# Patient Record
Sex: MALE | Race: WHITE | NOT HISPANIC OR LATINO | Employment: FULL TIME | URBAN - NONMETROPOLITAN AREA
[De-identification: names, ages, dates, MRNs, and addresses within clinical notes are randomized per-mention and may not be internally consistent; named-entity substitution may affect disease eponyms.]

---

## 2021-10-25 ENCOUNTER — HOSPITAL ENCOUNTER (EMERGENCY)
Facility: HOSPITAL | Age: 31
Discharge: HOME | End: 2021-10-25
Attending: EMERGENCY MEDICINE | Admitting: EMERGENCY MEDICINE

## 2021-10-25 ENCOUNTER — APPOINTMENT (OUTPATIENT)
Dept: GENERAL RADIOLOGY | Facility: HOSPITAL | Age: 31
End: 2021-10-25

## 2021-10-25 VITALS
HEART RATE: 75 BPM | OXYGEN SATURATION: 99 % | BODY MASS INDEX: 34 KG/M2 | SYSTOLIC BLOOD PRESSURE: 132 MMHG | DIASTOLIC BLOOD PRESSURE: 81 MMHG | RESPIRATION RATE: 18 BRPM | WEIGHT: 251 LBS | HEIGHT: 72 IN | TEMPERATURE: 98.5 F

## 2021-10-25 DIAGNOSIS — S99.922A INJURY OF LEFT FOOT, INITIAL ENCOUNTER: Primary | ICD-10-CM

## 2021-10-25 PROCEDURE — 99283 EMERGENCY DEPT VISIT LOW MDM: CPT

## 2021-10-25 PROCEDURE — 73630 X-RAY EXAM OF FOOT: CPT

## 2021-10-25 RX ORDER — IBUPROFEN 800 MG/1
800 TABLET ORAL ONCE
Status: COMPLETED | OUTPATIENT
Start: 2021-10-25 | End: 2021-10-25

## 2021-10-25 RX ADMIN — IBUPROFEN 800 MG: 800 TABLET ORAL at 11:46

## 2021-10-25 NOTE — DISCHARGE INSTRUCTIONS
Rest, ice, and elevate the foot to help with pain and swelling.  Alternate ibuprofen and Tylenol.  May use walking boot to help provide comfort.  You could have some underlying strain or sprain of the tendons or ligaments around the foot.  You can bear weight and perform exercises as tolerated.  You will need to follow-up with orthopedic surgery if your symptoms persist or worsen, may contact Dr. Díaz for an appointment.  Return to the ER for any change, worsening symptoms, or any additional concerns.

## 2021-10-25 NOTE — ED PROVIDER NOTES
"Subjective   Patient is a 31-year-old male presenting to the ER for evaluation of foot pain and injury.  He is currently in training for [employer].  He states this morning they were warming up and doing exercises.  He states that he took off to his friends and felt a pop in the plantar aspect of his left foot.  He states that since then there has been swelling.  He has been able to bear weight but was afraid he may have torn or injured something.  He denies any pain in the ankle.  Denies any obvious bruising.  He took Tylenol last night but has not had any medicine for pain since the injury.  Denies any other symptoms at this time          Review of Systems   Constitutional: Negative for chills and fever.   HENT: Negative.    Eyes: Negative.    Respiratory: Negative.    Cardiovascular: Negative.    Gastrointestinal: Negative.    Genitourinary: Negative.    Musculoskeletal: Positive for arthralgias and myalgias.   Skin: Negative.    Allergic/Immunologic: Negative for immunocompromised state.   Neurological: Negative.    Psychiatric/Behavioral: Negative.        History reviewed. No pertinent past medical history.    Allergies   Allergen Reactions   • Morphine Delirium   • Amoxicillin Rash       History reviewed. No pertinent surgical history.    History reviewed. No pertinent family history.    Social History     Socioeconomic History   • Marital status:            Objective   Physical Exam  Vitals and nursing note reviewed.     /81 (BP Location: Right arm, Patient Position: Sitting)   Pulse 75   Temp 98.5 °F (36.9 °C) (Oral)   Resp 18   Ht 182.9 cm (72\")   Wt 114 kg (251 lb)   SpO2 99%   BMI 34.04 kg/m²     GEN: No acute distress, sitting up on the stretcher.  Awake and alert.  Does not appear septic or toxic.  Head: Normocephalic, atraumatic  Eyes: EOM intact  ENT: Mask in place per protocol  Cardiovascular: Regular rate and rhythm  Lungs: Clear to auscultation bilaterally without " adventitious sounds  Abdomen: Soft, nontender, nondistended, no peritoneal signs, no guarding  Extremities: No obvious deformity.  Patient does have some mild tenderness on the plantar aspect of the left midfoot, no obvious ecchymosis.  There is no tenderness of the ankle.  He can flex and extend the ankle.  There is no focal tenderness over the midfoot on the dorsal aspect.  Dorsalis pedis pulse is 2+.  Neuro: GCS 15  Psych: Mood and affect are appropriate    Procedures           ED Course  ED Course as of 10/25/21 1419   Mon Oct 25, 2021   1257 PROCEDURE: XR FOOT 3+ VW LEFT-     History: Foot injury, pain to the mid plantar aspect     COMPARISON: None.     FINDINGS:  A 3 view exam demonstrates no acute fracture or dislocation.  The joint spaces are preserved. No soft tissue abnormality is seen.     IMPRESSION:  No acute fracture.                     Images were reviewed, interpreted, and dictated by Dr. Mary Bermudez M.D.  Transcribed by Anel Salgado PA-C. [LA]   1302 Discussed findings with patient.  We will give walking boot.  He declined crutches.  We will give him orthopedic surgery follow-up as needed. [LA]      ED Course User Index  [LA] Claribel Michaels PA-C                                           MDM  Number of Diagnoses or Management Options  Injury of left foot, initial encounter  Diagnosis management comments: On arrival, patient stable.  Differential could include fracture, strain, and other concerns.  Will give ibuprofen and obtain x-ray for further evaluation.    X-ray was read by the radiologist, did not see any acute fractures.  Discussed findings with the patient.  We will give walking boot for comfort.  He declined crutches.  I did believe he can likely perform activities as tolerated.  He may want to refrain from running for the next few days so he can rest his foot.  We discussed symptomatic treatment with rest, ice and elevation.  Gave orthopedic surgery follow-up as needed.   Discussed strict return precautions.  He verbalized understanding and was in agreement with this plan of care.       Amount and/or Complexity of Data Reviewed  Tests in the radiology section of CPT®: reviewed and ordered  Discussion of test results with the performing providers: yes  Review and summarize past medical records: yes  Discuss the patient with other providers: yes    Risk of Complications, Morbidity, and/or Mortality  Presenting problems: low  Diagnostic procedures: low  Management options: low    Patient Progress  Patient progress: stable      Final diagnoses:   Injury of left foot, initial encounter       ED Disposition  ED Disposition     ED Disposition Condition Comment    Discharge Stable           Darian Díaz MD  [address] [facility] [address]  [facility] [address]  [phone]    Schedule an appointment as soon as possible for a visit   As needed, If symptoms worsen         Medication List      No changes were made to your prescriptions during this visit.          Claribel Michaels PA-C  10/25/21 1418